# Patient Record
Sex: MALE | Race: WHITE | Employment: FULL TIME | URBAN - METROPOLITAN AREA
[De-identification: names, ages, dates, MRNs, and addresses within clinical notes are randomized per-mention and may not be internally consistent; named-entity substitution may affect disease eponyms.]

---

## 2018-04-25 ENCOUNTER — OFFICE VISIT (OUTPATIENT)
Dept: FAMILY MEDICINE CLINIC | Facility: CLINIC | Age: 28
End: 2018-04-25

## 2018-04-25 VITALS
RESPIRATION RATE: 16 BRPM | HEIGHT: 74 IN | BODY MASS INDEX: 32.47 KG/M2 | HEART RATE: 72 BPM | SYSTOLIC BLOOD PRESSURE: 108 MMHG | DIASTOLIC BLOOD PRESSURE: 60 MMHG | TEMPERATURE: 99 F | WEIGHT: 253 LBS

## 2018-04-25 DIAGNOSIS — J02.9 SORE THROAT: Primary | ICD-10-CM

## 2018-04-25 DIAGNOSIS — R05.9 COUGH: ICD-10-CM

## 2018-04-25 PROCEDURE — 99213 OFFICE O/P EST LOW 20 MIN: CPT | Performed by: PHYSICIAN ASSISTANT

## 2018-04-25 PROCEDURE — 87880 STREP A ASSAY W/OPTIC: CPT | Performed by: PHYSICIAN ASSISTANT

## 2018-04-25 RX ORDER — CODEINE PHOSPHATE AND GUAIFENESIN 10; 100 MG/5ML; MG/5ML
5 SOLUTION ORAL 4 TIMES DAILY PRN
Qty: 180 ML | Refills: 0 | Status: SHIPPED | OUTPATIENT
Start: 2018-04-25 | End: 2021-05-18

## 2018-04-25 NOTE — PROGRESS NOTES
CC:  Patient presents with:  Sore Throat: since sunday  Body ache and/or chills: started yesterday  Sinus Problem: sinus congestion drainage      HPI: Cherise Jones presents with complaints of ST, cough, nasal congestion, body aches, and chills for 3 days.  He has clear rhinorrhea   Throat: Mild erythema; no oral lesions or exudates; tonsils not enlarged   Eyes: PERRLA; no ulcers, abrasions, or FBs; lids normal; no edema; conjunctiva clear    Sinus: No TTP over frontal/ethmoid sinuses; no TTP over maxillary sinuses; Encounter      Rapid Strep    Signed Prescriptions Disp Refills    guaiFENesin-codeine (CHERATUSSIN AC) 100-10 MG/5ML Oral Solution 180 mL 0      Sig: Take 5 mL by mouth 4 (four) times daily as needed for cough.

## 2023-03-02 ENCOUNTER — HOSPITAL ENCOUNTER (EMERGENCY)
Facility: HOSPITAL | Age: 33
Discharge: HOME | End: 2023-03-02
Attending: STUDENT IN AN ORGANIZED HEALTH CARE EDUCATION/TRAINING PROGRAM
Payer: OTHER MISCELLANEOUS

## 2023-03-02 VITALS
DIASTOLIC BLOOD PRESSURE: 76 MMHG | HEIGHT: 75 IN | WEIGHT: 270 LBS | HEART RATE: 110 BPM | RESPIRATION RATE: 17 BRPM | BODY MASS INDEX: 33.57 KG/M2 | TEMPERATURE: 98 F | SYSTOLIC BLOOD PRESSURE: 143 MMHG | OXYGEN SATURATION: 98 %

## 2023-03-02 DIAGNOSIS — S61.011A LACERATION OF RIGHT THUMB WITHOUT FOREIGN BODY WITHOUT DAMAGE TO NAIL, INITIAL ENCOUNTER: Primary | ICD-10-CM

## 2023-03-02 PROCEDURE — 99283 EMERGENCY DEPT VISIT LOW MDM: CPT

## 2023-03-02 PROCEDURE — 90471 IMMUNIZATION ADMIN: CPT

## 2023-03-02 PROCEDURE — 12001 RPR S/N/AX/GEN/TRNK 2.5CM/<: CPT

## 2023-03-02 NOTE — ED INITIAL ASSESSMENT (HPI)
PT cut R 1st digit on knife at work. PT works at [employer].  Believes tetanus vaccine was not given in last 10 years

## (undated) NOTE — LETTER
Date & Time: 3/2/2023, 1:46 PM  Patient: Ottoniel Evans  Encounter Provider(s):    Jagdeep Varela MD       To Whom It May Concern:    Bessie Schulte was seen and treated in our department on 3/2/2023. He can return to work 3/3.     If you have any questions or concerns, please do not hesitate to call.        _____________________________  Physician/[facility] Signature